# Patient Record
Sex: MALE | Race: AMERICAN INDIAN OR ALASKA NATIVE
[De-identification: names, ages, dates, MRNs, and addresses within clinical notes are randomized per-mention and may not be internally consistent; named-entity substitution may affect disease eponyms.]

---

## 2021-12-20 ENCOUNTER — HOSPITAL ENCOUNTER (EMERGENCY)
Dept: HOSPITAL 5 - ED | Age: 31
LOS: 1 days | Discharge: HOME | End: 2021-12-21
Payer: SELF-PAY

## 2021-12-20 VITALS — DIASTOLIC BLOOD PRESSURE: 73 MMHG | SYSTOLIC BLOOD PRESSURE: 122 MMHG

## 2021-12-20 DIAGNOSIS — R05.9: Primary | ICD-10-CM

## 2021-12-20 PROCEDURE — 99283 EMERGENCY DEPT VISIT LOW MDM: CPT

## 2021-12-20 PROCEDURE — 71046 X-RAY EXAM CHEST 2 VIEWS: CPT

## 2021-12-20 PROCEDURE — 93005 ELECTROCARDIOGRAM TRACING: CPT

## 2021-12-20 NOTE — XRAY REPORT
CHEST 2 VIEWS 



INDICATION / CLINICAL INFORMATION:

cough and sob.



COMPARISON: 

None available.



FINDINGS:



SUPPORT DEVICES: None.

HEART / MEDIASTINUM: No significant abnormality. 

LUNGS / PLEURA: No significant pulmonary abnormality. No significant pleural effusion. No pneumothora
x. 



ADDITIONAL FINDINGS: No significant additional findings.



IMPRESSION:

1. No acute abnormality of the chest.



Signer Name: Jim Lassiter MD 

Signed: 12/20/2021 10:44 PM

Workstation Name: [id number]

## 2021-12-20 NOTE — EMERGENCY DEPARTMENT REPORT
ED General Adult HPI





- General


Stated complaint: CHEST PAIN


Time Seen by Provider: 21 21:50





- History of Present Illness


-: Gradual, days(s) (2)


Radiation: non-radiation


Quality: aching, dull


Consistency: constant


Improves with: none


Worsens with: none


Associated Symptoms: denies other symptoms, cough.  denies: nausea/vomiting


Treatments Prior to Arrival: none





- Related Data


                                  Previous Rx's











 Medication  Instructions  Recorded  Last Taken  Type


 


Albuterol Mdi (or & Nicu Only) 2 puff IH QID PRN #1 inhalation 21 Unknown 

Rx





[ProAir HFA Inhaler]    


 


Azithromycin [Zithromax] 500 mg PO QDAY #3 tablet 21 Unknown Rx


 


Benzonatate [Tessalon Perles] 100 mg PO Q8HR #20 capsule 21 Unknown Rx











                                    Allergies











Allergy/AdvReac Type Severity Reaction Status Date / Time


 


No Known Allergies Allergy   Verified 21 21:57














ED Review of Systems


ROS: 


Stated complaint: CHEST PAIN


Other details as noted in HPI





Comment: All other systems reviewed and negative





ED Past Medical Hx





- Medications


Home Medications: 


                                Home Medications











 Medication  Instructions  Recorded  Confirmed  Last Taken  Type


 


Albuterol Mdi (or & Nicu Only) 2 puff IH QID PRN #1 inhalation 21  Unknown

Rx





[ProAir HFA Inhaler]     


 


Azithromycin [Zithromax] 500 mg PO QDAY #3 tablet 21  Unknown Rx


 


Benzonatate [Tessalon Perles] 100 mg PO Q8HR #20 capsule 21  Unknown Rx














ED Physical Exam





- General


General appearance: alert, in no apparent distress





- Head


Head exam: Present: atraumatic, normocephalic





- Eye


Eye exam: Present: normal appearance, PERRL, EOMI


Pupils: Present: normal accommodation





- ENT


ENT exam: Present: normal exam, normal orophraynx, mucous membranes moist





- Neck


Neck exam: Present: normal inspection





- Respiratory


Respiratory exam: Present: normal lung sounds bilaterally, rhonchi.  Absent: 

respiratory distress





- Cardiovascular


Cardiovascular Exam: Present: regular rate, normal rhythm.  Absent: systolic 

murmur, diastolic murmur, rubs, gallop





- GI/Abdominal


GI/Abdominal exam: Present: soft, normal bowel sounds





- Rectal


Rectal exam: Present: deferred





- Extremities Exam


Extremities exam: Present: normal inspection





- Back Exam


Back exam: Present: normal inspection





- Neurological Exam


Neurological exam: Present: alert, oriented X3





- Psychiatric


Psychiatric exam: Present: normal affect, normal mood





- Skin


Skin exam: Present: warm, dry, intact, normal color.  Absent: rash





ED Course


                                   Vital Signs











  21





  21:50


 


Temperature 98.5 F


 


Pulse Rate 63


 


Respiratory 17





Rate 


 


Blood Pressure 122/73


 


O2 Sat by Pulse 100





Oximetry 














ED Medical Decision Making





- Radiology Data


Radiology results: report reviewed





[facility]  


                                     [address] [place] [address]  


 


                                            XRay Report   


                                               Signed  


 


Patient: LISA PERKINS                                                           

    MR#: [id number]  


 


: 1990                                                                

Acct:[id number]      


 


Age/Sex: 31 / M                                                                

ADM Date: 21     


 


Loc: ED       


Attending Dr:   


 


 


Ordering Physician: ADELITA BLACK  


Date of Service: 21  


Procedure(s): XR chest routine 2V  


Accession Number(s): [id number]  


 


cc: ADELITA BLACK   


 


Fluoro Time In Minutes:   


 


CHEST 2 VIEWS   


 


 INDICATION / CLINICAL INFORMATION:  


 cough and sob.  


 


 COMPARISON:   


 None available.  


 


 FINDINGS:  


 


 SUPPORT DEVICES: None.  


 HEART / MEDIASTINUM: No significant abnormality.   


 LUNGS / PLEURA: No significant pulmonary abnormality. No significant pleural 

effusion. No 


pneumothorax.   


 


 ADDITIONAL FINDINGS: No significant additional findings.  


 


 IMPRESSION:  


 1. No acute abnormality of the chest.  


 


 Signer Name: Jim Lassiter MD   


 Signed: 2021 10:44 PM  


 Workstation Name: [id number]   


 


 


Transcribed By: MN  


Dictated By: Jim Lassiter MD  


Electronically Authenticated By: Jim Lassiter MD    


Signed Date/Time: 21                                


 


 


 


DD/DT: 21                                                            

 


TD/TT:























Print





- Medical Decision Making





This patient presents with acute cough, most consistent with benign emergent 

medical condition. Differential diagnosis includes bronchitis, pneumonia, COVID-

19, asthma, hyperreactive airway disease. Presentation not consistent with acute

bacterial pneumonia, influenza, asthma, transient airway hyperresponsiveness. 

Presentation not consistent with chronic causes of cough (including GERD, a

sthma, postnasal discharge, medication side effect, CHF, lung cancer or mass).





Plan: ***CXR, supportive care, reassess


Critical care attestation.: 


If time is entered above; I have spent that time in minutes in the direct care 

of this critically ill patient, excluding procedure time.








ED Disposition


Clinical Impression: 


 Cough





Disposition: 01 HOME / SELF CARE / HOMELESS


Is pt being admited?: No


Does the pt Need Aspirin: No


Condition: Stable


Instructions:  Cool Mist Vaporizer, Cough, Adult, Easy-to-Read, Cough, Adult


Prescriptions: 


Albuterol Mdi (or & Nicu Only) [ProAir HFA Inhaler] 2 puff IH QID PRN #1 

inhalation


 PRN Reason: Shortness Of Breath


Benzonatate [Tessalon Perles] 100 mg PO Q8HR #20 capsule


Azithromycin [Zithromax] 500 mg PO QDAY #3 tablet


Referrals: 


[facility] [Provider Group] - 3-5 Days

## 2021-12-21 NOTE — ELECTROCARDIOGRAPH REPORT
[facility]

                                       

Test Date:    2021               Test Time:    21:54:53

Pat Name:     LISA PERKINS             Department:   

Patient ID:   [id number]          Room:          

Gender:       M                        Technician:   ONEIL

:          1990               Requested By: CLINT JAMISON

Order Number: [id number]              Reading MD:   Duc Matthews

                                 Measurements

Intervals                              Axis          

Rate:         50                       P:            68

MI:           141                      QRS:          83

QRSD:         102                      T:            64

QT:           437                                    

QTc:          398                                    

                           Interpretive Statements

Sinus bradycardia

Early repolarization ST changes

No previous ECG available for comparison

Electronically Signed On 2021 17:38:39 EST by Duc Matthews